# Patient Record
Sex: FEMALE | Race: WHITE
[De-identification: names, ages, dates, MRNs, and addresses within clinical notes are randomized per-mention and may not be internally consistent; named-entity substitution may affect disease eponyms.]

---

## 2018-02-15 ENCOUNTER — HOSPITAL ENCOUNTER (OUTPATIENT)
Dept: HOSPITAL 47 - RADCTMAIN | Age: 53
Discharge: HOME | End: 2018-02-15
Payer: COMMERCIAL

## 2018-02-15 ENCOUNTER — HOSPITAL ENCOUNTER (OUTPATIENT)
Dept: HOSPITAL 47 - LABWHC1 | Age: 53
Discharge: HOME | End: 2018-02-15
Payer: COMMERCIAL

## 2018-02-15 DIAGNOSIS — R10.9: Primary | ICD-10-CM

## 2018-02-15 LAB
ALBUMIN SERPL-MCNC: 4 G/DL (ref 3.5–5)
ALP SERPL-CCNC: 100 U/L (ref 38–126)
ALT SERPL-CCNC: 47 U/L (ref 9–52)
ANION GAP SERPL CALC-SCNC: 12 MMOL/L
AST SERPL-CCNC: 36 U/L (ref 14–36)
BASOPHILS # BLD AUTO: 0 K/UL (ref 0–0.2)
BASOPHILS NFR BLD AUTO: 0 %
BUN SERPL-SCNC: 11 MG/DL (ref 7–17)
CALCIUM SPEC-MCNC: 9.6 MG/DL (ref 8.4–10.2)
CHLORIDE SERPL-SCNC: 103 MMOL/L (ref 98–107)
CO2 SERPL-SCNC: 24 MMOL/L (ref 22–30)
EOSINOPHIL # BLD AUTO: 0.2 K/UL (ref 0–0.7)
EOSINOPHIL NFR BLD AUTO: 2 %
ERYTHROCYTE [DISTWIDTH] IN BLOOD BY AUTOMATED COUNT: 4.79 M/UL (ref 3.8–5.4)
ERYTHROCYTE [DISTWIDTH] IN BLOOD: 13.7 % (ref 11.5–15.5)
GLUCOSE SERPL-MCNC: 115 MG/DL (ref 74–99)
HCT VFR BLD AUTO: 45.5 % (ref 34–46)
HGB BLD-MCNC: 14.3 GM/DL (ref 11.4–16)
LYMPHOCYTES # SPEC AUTO: 2.5 K/UL (ref 1–4.8)
LYMPHOCYTES NFR SPEC AUTO: 21 %
MCH RBC QN AUTO: 29.9 PG (ref 25–35)
MCHC RBC AUTO-ENTMCNC: 31.4 G/DL (ref 31–37)
MCV RBC AUTO: 95 FL (ref 80–100)
MONOCYTES # BLD AUTO: 0.6 K/UL (ref 0–1)
MONOCYTES NFR BLD AUTO: 5 %
NEUTROPHILS # BLD AUTO: 8.1 K/UL (ref 1.3–7.7)
NEUTROPHILS NFR BLD AUTO: 70 %
PLATELET # BLD AUTO: 240 K/UL (ref 150–450)
POTASSIUM SERPL-SCNC: 3.8 MMOL/L (ref 3.5–5.1)
PROT SERPL-MCNC: 7.7 G/DL (ref 6.3–8.2)
SODIUM SERPL-SCNC: 139 MMOL/L (ref 137–145)
WBC # BLD AUTO: 11.5 K/UL (ref 3.8–10.6)

## 2018-02-15 PROCEDURE — 80053 COMPREHEN METABOLIC PANEL: CPT

## 2018-02-15 PROCEDURE — 87328 CRYPTOSPORIDIUM AG IA: CPT

## 2018-02-15 PROCEDURE — 87046 STOOL CULTR AEROBIC BACT EA: CPT

## 2018-02-15 PROCEDURE — 87045 FECES CULTURE AEROBIC BACT: CPT

## 2018-02-15 PROCEDURE — 87329 GIARDIA AG IA: CPT

## 2018-02-15 PROCEDURE — 36415 COLL VENOUS BLD VENIPUNCTURE: CPT

## 2018-02-15 PROCEDURE — 85025 COMPLETE CBC W/AUTO DIFF WBC: CPT

## 2018-02-15 PROCEDURE — 74177 CT ABD & PELVIS W/CONTRAST: CPT

## 2018-02-15 PROCEDURE — 87324 CLOSTRIDIUM AG IA: CPT

## 2018-02-15 NOTE — CT
EXAMINATION TYPE: CT abdomen pelvis w con

 

DATE OF EXAM: 2/15/2018

 

COMPARISON: NONE

 

HISTORY: Left lower quadrant pain

 

CT DLP: 3673.9 mGycm

Automated exposure control for dose reduction was used.

 

TECHNIQUE:  Helical acquisition of images from the lung bases through the pelvis have been completed.


 

CONTRAST: 

Performed with Oral Contrast and with IV Contrast, patient injected with 100 mL of Omnipaque 300.

 

FINDINGS: 

 

LUNG BASES: No significant abnormality is appreciated.

 

AORTA:  Atheromatous changes present without aneurysm, aortic atheromatous changes are present

 

LIVER/GB: Liver is enlarged and shows low attenuation likely due to hepatic steatosis. Gallbladder is
 normal.

 

PANCREAS: Small low dense focus within the region of the head of the pancreas may represent a small d
uodenal diverticulum at the 2nd-3rd portion.

 

SPLEEN: The spleen is enlarged.

 

ADRENALS: No significant abnormality is seen.

 

KIDNEYS: Some low dense foci associated with the kidneys may represent small cortical cysts

 

REPRODUCTIVE ORGANS: No significant abnormality is seen

 

BOWEL:  There is extensive sigmoid colonic wall thickening present, diverticular change present. Lindsey
sverse, and ascending and descending colon also shows abnormal wall thickening.

 

FREE AIR:  No Free Air visible.

 

ASCITES:  None visible.

 

PELVIC ADENOPATHY:  None visualized.

 

RETROPERITONEAL ADENOPATHY:  No Retroperitoneal Adenopathy visible.

 

URINARY BLADDER:  No significant abnormality is seen.

 

OSSEOUS STRUCTURES:  Suspect old fracture to the inferior pubic ramus on the right, there is cortical
 thickening. Degenerative disc and facet arthropathy changes are present in the lower lumbar spine.

 

IMPRESSION: 

CORRELATE FOR COLITIS. THERE MAY BE MUSCLE HYPERTROPHY IN THE SIGMOID COLON, DIFFICULT TO EXCLUDE A M
UCOSAL LESION, IF BOWEL SURVEILLANCE HAS NOT BEEN PERFORMED THEN IT SHOULD BE CONSIDERED TO EXCLUDE U
NDERLYING MASS.

## 2019-03-20 ENCOUNTER — HOSPITAL ENCOUNTER (EMERGENCY)
Dept: HOSPITAL 47 - EC | Age: 54
Discharge: HOME | End: 2019-03-20
Payer: COMMERCIAL

## 2019-03-20 VITALS — DIASTOLIC BLOOD PRESSURE: 99 MMHG | HEART RATE: 91 BPM | SYSTOLIC BLOOD PRESSURE: 149 MMHG

## 2019-03-20 VITALS — TEMPERATURE: 98.3 F | RESPIRATION RATE: 18 BRPM

## 2019-03-20 DIAGNOSIS — F17.200: ICD-10-CM

## 2019-03-20 DIAGNOSIS — E07.9: ICD-10-CM

## 2019-03-20 DIAGNOSIS — Z79.890: ICD-10-CM

## 2019-03-20 DIAGNOSIS — Z79.899: ICD-10-CM

## 2019-03-20 DIAGNOSIS — K57.32: Primary | ICD-10-CM

## 2019-03-20 DIAGNOSIS — Z98.51: ICD-10-CM

## 2019-03-20 LAB
ALBUMIN SERPL-MCNC: 3.9 G/DL (ref 3.5–5)
ALP SERPL-CCNC: 85 U/L (ref 38–126)
ALT SERPL-CCNC: 32 U/L (ref 9–52)
AMYLASE SERPL-CCNC: 36 U/L (ref 30–110)
ANION GAP SERPL CALC-SCNC: 8 MMOL/L
APTT BLD: 24.5 SEC (ref 22–30)
AST SERPL-CCNC: 24 U/L (ref 14–36)
BASOPHILS # BLD AUTO: 0 K/UL (ref 0–0.2)
BASOPHILS NFR BLD AUTO: 0 %
BUN SERPL-SCNC: 11 MG/DL (ref 7–17)
CALCIUM SPEC-MCNC: 9.3 MG/DL (ref 8.4–10.2)
CHLORIDE SERPL-SCNC: 107 MMOL/L (ref 98–107)
CO2 SERPL-SCNC: 24 MMOL/L (ref 22–30)
EOSINOPHIL # BLD AUTO: 0.1 K/UL (ref 0–0.7)
EOSINOPHIL NFR BLD AUTO: 1 %
ERYTHROCYTE [DISTWIDTH] IN BLOOD BY AUTOMATED COUNT: 4.87 M/UL (ref 3.8–5.4)
ERYTHROCYTE [DISTWIDTH] IN BLOOD: 13.4 % (ref 11.5–15.5)
GLUCOSE SERPL-MCNC: 114 MG/DL (ref 74–99)
HCT VFR BLD AUTO: 43.6 % (ref 34–46)
HGB BLD-MCNC: 14 GM/DL (ref 11.4–16)
INR PPP: 0.9 (ref ?–1.2)
LIPASE SERPL-CCNC: 39 U/L (ref 23–300)
LYMPHOCYTES # SPEC AUTO: 1.8 K/UL (ref 1–4.8)
LYMPHOCYTES NFR SPEC AUTO: 17 %
MCH RBC QN AUTO: 28.8 PG (ref 25–35)
MCHC RBC AUTO-ENTMCNC: 32.2 G/DL (ref 31–37)
MCV RBC AUTO: 89.5 FL (ref 80–100)
MONOCYTES # BLD AUTO: 0.6 K/UL (ref 0–1)
MONOCYTES NFR BLD AUTO: 6 %
NEUTROPHILS # BLD AUTO: 7.9 K/UL (ref 1.3–7.7)
NEUTROPHILS NFR BLD AUTO: 75 %
PH UR: 7 [PH] (ref 5–8)
PLATELET # BLD AUTO: 241 K/UL (ref 150–450)
POTASSIUM SERPL-SCNC: 4.1 MMOL/L (ref 3.5–5.1)
PROT SERPL-MCNC: 7.5 G/DL (ref 6.3–8.2)
PT BLD: 10.2 SEC (ref 9–12)
SODIUM SERPL-SCNC: 139 MMOL/L (ref 137–145)
SP GR UR: 1 (ref 1–1.03)
SQUAMOUS UR QL AUTO: 4 /HPF (ref 0–4)
UROBILINOGEN UR QL STRIP: <2 MG/DL (ref ?–2)
WBC # BLD AUTO: 10.5 K/UL (ref 3.8–10.6)
WBC #/AREA URNS HPF: 3 /HPF (ref 0–5)

## 2019-03-20 PROCEDURE — 85025 COMPLETE CBC W/AUTO DIFF WBC: CPT

## 2019-03-20 PROCEDURE — 36415 COLL VENOUS BLD VENIPUNCTURE: CPT

## 2019-03-20 PROCEDURE — 85730 THROMBOPLASTIN TIME PARTIAL: CPT

## 2019-03-20 PROCEDURE — 99284 EMERGENCY DEPT VISIT MOD MDM: CPT

## 2019-03-20 PROCEDURE — 74177 CT ABD & PELVIS W/CONTRAST: CPT

## 2019-03-20 PROCEDURE — 85610 PROTHROMBIN TIME: CPT

## 2019-03-20 PROCEDURE — 80053 COMPREHEN METABOLIC PANEL: CPT

## 2019-03-20 PROCEDURE — 96360 HYDRATION IV INFUSION INIT: CPT

## 2019-03-20 PROCEDURE — 81001 URINALYSIS AUTO W/SCOPE: CPT

## 2019-03-20 PROCEDURE — 83690 ASSAY OF LIPASE: CPT

## 2019-03-20 PROCEDURE — 82150 ASSAY OF AMYLASE: CPT

## 2019-03-20 PROCEDURE — 96372 THER/PROPH/DIAG INJ SC/IM: CPT

## 2019-03-20 NOTE — CT
EXAMINATION TYPE: CT abdomen pelvis w con

 

DATE OF EXAM: 3/20/2019

 

COMPARISON: 2/15/2018

 

HISTORY: Left lower quadrant pain x 2 days.

 

CT DLP: 2297.7 mGycm

 

CONTRAST: 

CT scan of the abdomen and pelvis is performed without Oral Contrast and with IV Contrast, patient in
jected with 100 mL of Isovue 300.

 

FINDINGS: 

LUNG BASES-: No visible nodule.  No infiltrate. 

 

LIVER/GB:   No calcified gallstones.  Suspect underlying hepatic steatosis versus diffuse hepatocellu
lar disease. No space occupying hepatic lesion. Biliary tree is of normal caliber. 

 

PANCREAS:  No inflammation.  No distinct mass. 

 

SPLEEN:  No splenic enlargement.  No lesion seen. 

 

ADRENALS:  No nodule.  No thickening. 

 

KIDNEYS/BLADDER:  No hydronephrosis.  No nephrolithiasis. Hypoattenuating renal lesions may reflect c
ysts. Urinary bladder grossly unremarkable. 

 

BOWEL: Normal appendix. There is wall thickening in the surrounding inflammatory change involving the
 distal sigmoid colon compatible with acute diverticulitis. No evidence for abscess or perforation at
 this time. Remaining colon and small bowel appear unremarkable.

 

GENITAL ORGANS:  No gross abnormality. 

 

LYMPH NODES:  No greater than 1cm abdominal or pelvic lymph nodes are appreciated.

 

AORTA: No significant abnormality. 

 

OSSEOUS STRUCTURES: Severe degenerative change lumbar spine.

 

OTHER:  No significant additional abnormality is seen. 

 

IMPRESSION: 

1. Uncomplicated acute sigmoid diverticulitis.

## 2019-03-20 NOTE — ED
General Adult HPI





- General


Chief complaint: Abdominal Pain


Stated complaint: L Side Pain


Time Seen by Provider: 03/20/19 10:56


Source: patient, RN notes reviewed


Mode of arrival: ambulatory


Limitations: no limitations





- History of Present Illness


Initial comments: 





Patient is a pleasant 53-year-old female presenting to the emergency Department 

with complaints of abdominal discomfort.  Onset of symptoms was a week or so ago

and lasted a day or 2 then resolved.  Symptoms returned yesterday and seem 

somewhat worse.  Discomfort seems worsened since that time.  Patient did go to 

the clinic today and was advised to come to the emergency department.  Dis

comfort is worse with position changes and bumps in the car ride.  Discomfort is

left lower abdomen.  No fever.  No nausea vomiting.  patient diarrhea.  No 

urinary symptoms.  No history of similar symptoms prior to 1 week ago.





- Related Data


                                Home Medications











 Medication  Instructions  Recorded  Confirmed


 


Cholecalciferol (Vitamin D3) 2,000 unit PO DAILY 03/20/19 03/20/19





[Vitamin D3]   


 


Levothyroxine Sodium [Synthroid] 100 mcg PO DAILY 03/20/19 03/20/19








                                  Previous Rx's











 Medication  Instructions  Recorded


 


Levofloxacin [Levaquin] 500 mg PO DAILY #10 tab 03/20/19


 


metroNIDAZOLE [Flagyl] 500 mg PO TID #30 tab 03/20/19











                                    Allergies











Allergy/AdvReac Type Severity Reaction Status Date / Time


 


No Known Allergies Allergy   Verified 03/20/19 10:54














Review of Systems


ROS Statement: 


Those systems with pertinent positive or pertinent negative responses have been 

documented in the HPI.





ROS Other: All systems not noted in ROS Statement are negative.


Constitutional: Denies: fever, chills


Eyes: Denies: eye pain


ENT: Denies: ear pain


Respiratory: Denies: cough, dyspnea


Cardiovascular: Denies: chest pain


Endocrine: Denies: fatigue


Gastrointestinal: Reports: abdominal pain.  Denies: nausea, vomiting, diarrhea, 

constipation


Genitourinary: Denies: urgency, dysuria, frequency, hematuria


Musculoskeletal: Denies: back pain


Skin: Denies: rash


Neurological: Denies: weakness





Past Medical History


Past Medical History: Thyroid Disorder


History of Any Multi-Drug Resistant Organisms: None Reported


Past Surgical History: Tubal Ligation


Past Psychological History: No Psychological Hx Reported


Smoking Status: Current every day smoker


Past Alcohol Use History: Occasional


Past Drug Use History: None Reported





General Exam


Limitations: no limitations


General appearance: alert, in no apparent distress


Head exam: Present: atraumatic


Eye exam: Present: normal appearance, PERRL


ENT exam: Present: normal oropharynx


Neck exam: Present: normal inspection


Respiratory exam: Present: normal lung sounds bilaterally


Cardiovascular Exam: Present: regular rate, normal rhythm


  ** Expanded


Peripheral pulses: 2+: Posterior Tibialis (R), Posterior Tibialis (L)


GI/Abdominal exam: Present: soft, tenderness (Moderate tenderness left lower 

quadrant), normal bowel sounds.  Absent: distended, guarding, rebound, rigid, 

pulsatile mass


Extremities exam: Present: normal inspection.  Absent: pedal edema, calf 

tenderness


Neurological exam: Present: alert


Psychiatric exam: Present: normal affect, normal mood


Skin exam: Present: normal color





Course


                                   Vital Signs











  03/20/19





  10:51


 


Temperature 98.3 F


 


Pulse Rate 105 H


 


Respiratory 18





Rate 


 


Blood Pressure 133/87


 


O2 Sat by Pulse 96





Oximetry 














Medical Decision Making





- Medical Decision Making





Patient reevaluated and improved.  Patient updated on results.  Patient is com

fortable with discharge home.





- Lab Data


Result diagrams: 


                                 03/20/19 11:34





                                 03/20/19 11:34


                                   Lab Results











  03/20/19 03/20/19 03/20/19 Range/Units





  11:00 11:34 11:34 


 


WBC    10.5  (3.8-10.6)  k/uL


 


RBC    4.87  (3.80-5.40)  m/uL


 


Hgb    14.0  (11.4-16.0)  gm/dL


 


Hct    43.6  (34.0-46.0)  %


 


MCV    89.5  (80.0-100.0)  fL


 


MCH    28.8  (25.0-35.0)  pg


 


MCHC    32.2  (31.0-37.0)  g/dL


 


RDW    13.4  (11.5-15.5)  %


 


Plt Count    241  (150-450)  k/uL


 


Neutrophils %    75  %


 


Lymphocytes %    17  %


 


Monocytes %    6  %


 


Eosinophils %    1  %


 


Basophils %    0  %


 


Neutrophils #    7.9 H  (1.3-7.7)  k/uL


 


Lymphocytes #    1.8  (1.0-4.8)  k/uL


 


Monocytes #    0.6  (0-1.0)  k/uL


 


Eosinophils #    0.1  (0-0.7)  k/uL


 


Basophils #    0.0  (0-0.2)  k/uL


 


PT     (9.0-12.0)  sec


 


INR     (<1.2)  


 


APTT     (22.0-30.0)  sec


 


Sodium   139   (137-145)  mmol/L


 


Potassium   4.1   (3.5-5.1)  mmol/L


 


Chloride   107   ()  mmol/L


 


Carbon Dioxide   24   (22-30)  mmol/L


 


Anion Gap   8   mmol/L


 


BUN   11   (7-17)  mg/dL


 


Creatinine   0.48 L   (0.52-1.04)  mg/dL


 


Est GFR (CKD-EPI)AfAm   >90   (>60 ml/min/1.73 sqM)  


 


Est GFR (CKD-EPI)NonAf   >90   (>60 ml/min/1.73 sqM)  


 


Glucose   114 H   (74-99)  mg/dL


 


Calcium   9.3   (8.4-10.2)  mg/dL


 


Total Bilirubin   0.9   (0.2-1.3)  mg/dL


 


AST   24   (14-36)  U/L


 


ALT   32   (9-52)  U/L


 


Alkaline Phosphatase   85   ()  U/L


 


Total Protein   7.5   (6.3-8.2)  g/dL


 


Albumin   3.9   (3.5-5.0)  g/dL


 


Amylase   36   ()  U/L


 


Lipase   39   ()  U/L


 


Urine Color  Light Yellow    


 


Urine Appearance  Cloudy H    (Clear)  


 


Urine pH  7.0    (5.0-8.0)  


 


Ur Specific Gravity  1.004    (1.001-1.035)  


 


Urine Protein  Negative    (Negative)  


 


Urine Glucose (UA)  Negative    (Negative)  


 


Urine Ketones  Negative    (Negative)  


 


Urine Blood  Negative    (Negative)  


 


Urine Nitrite  Negative    (Negative)  


 


Urine Bilirubin  Negative    (Negative)  


 


Urine Urobilinogen  <2.0    (<2.0)  mg/dL


 


Ur Leukocyte Esterase  Negative    (Negative)  


 


Urine WBC  3    (0-5)  /hpf


 


Ur Squamous Epith Cells  4    (0-4)  /hpf


 


Urine Bacteria  Moderate H    (None)  /hpf














  03/20/19 Range/Units





  11:34 


 


WBC   (3.8-10.6)  k/uL


 


RBC   (3.80-5.40)  m/uL


 


Hgb   (11.4-16.0)  gm/dL


 


Hct   (34.0-46.0)  %


 


MCV   (80.0-100.0)  fL


 


MCH   (25.0-35.0)  pg


 


MCHC   (31.0-37.0)  g/dL


 


RDW   (11.5-15.5)  %


 


Plt Count   (150-450)  k/uL


 


Neutrophils %   %


 


Lymphocytes %   %


 


Monocytes %   %


 


Eosinophils %   %


 


Basophils %   %


 


Neutrophils #   (1.3-7.7)  k/uL


 


Lymphocytes #   (1.0-4.8)  k/uL


 


Monocytes #   (0-1.0)  k/uL


 


Eosinophils #   (0-0.7)  k/uL


 


Basophils #   (0-0.2)  k/uL


 


PT  10.2  (9.0-12.0)  sec


 


INR  0.9  (<1.2)  


 


APTT  24.5  (22.0-30.0)  sec


 


Sodium   (137-145)  mmol/L


 


Potassium   (3.5-5.1)  mmol/L


 


Chloride   ()  mmol/L


 


Carbon Dioxide   (22-30)  mmol/L


 


Anion Gap   mmol/L


 


BUN   (7-17)  mg/dL


 


Creatinine   (0.52-1.04)  mg/dL


 


Est GFR (CKD-EPI)AfAm   (>60 ml/min/1.73 sqM)  


 


Est GFR (CKD-EPI)NonAf   (>60 ml/min/1.73 sqM)  


 


Glucose   (74-99)  mg/dL


 


Calcium   (8.4-10.2)  mg/dL


 


Total Bilirubin   (0.2-1.3)  mg/dL


 


AST   (14-36)  U/L


 


ALT   (9-52)  U/L


 


Alkaline Phosphatase   ()  U/L


 


Total Protein   (6.3-8.2)  g/dL


 


Albumin   (3.5-5.0)  g/dL


 


Amylase   ()  U/L


 


Lipase   ()  U/L


 


Urine Color   


 


Urine Appearance   (Clear)  


 


Urine pH   (5.0-8.0)  


 


Ur Specific Gravity   (1.001-1.035)  


 


Urine Protein   (Negative)  


 


Urine Glucose (UA)   (Negative)  


 


Urine Ketones   (Negative)  


 


Urine Blood   (Negative)  


 


Urine Nitrite   (Negative)  


 


Urine Bilirubin   (Negative)  


 


Urine Urobilinogen   (<2.0)  mg/dL


 


Ur Leukocyte Esterase   (Negative)  


 


Urine WBC   (0-5)  /hpf


 


Ur Squamous Epith Cells   (0-4)  /hpf


 


Urine Bacteria   (None)  /hpf














- Radiology Data


Radiology results: report reviewed (Computed tomography scan of the abdomen and 

pelvis shows uncompensated diverticulitis.)





Disposition


Clinical Impression: 


 Diverticulitis





Disposition: HOME SELF-CARE


Condition: Stable


Instructions (If sedation given, give patient instructions):  Diverticulitis 

(ED), Diverticulitis Diet (ED)


Additional Instructions: 


Please follow-up with primary care physician in the next couple days for 

recheck.  Return for fever, increased pain, worsening symptoms or other 

concerns.


Prescriptions: 


metroNIDAZOLE [Flagyl] 500 mg PO TID #30 tab


Levofloxacin [Levaquin] 500 mg PO DAILY #10 tab


Is patient prescribed a controlled substance at d/c from ED?: No


Referrals: 


Arturo Villalobos MD [Primary Care Provider] - 1-2 days


Time of Disposition: 13:03

## 2021-06-16 ENCOUNTER — HOSPITAL ENCOUNTER (OUTPATIENT)
Dept: HOSPITAL 47 - RADMAMWWP | Age: 56
Discharge: HOME | End: 2021-06-16
Attending: FAMILY MEDICINE
Payer: COMMERCIAL

## 2021-06-16 DIAGNOSIS — Z12.31: Primary | ICD-10-CM

## 2021-06-16 DIAGNOSIS — Z78.0: ICD-10-CM

## 2021-06-16 PROCEDURE — 77067 SCR MAMMO BI INCL CAD: CPT

## 2021-06-18 NOTE — MM
Reason for exam: screening  (asymptomatic).

Last mammogram was performed 7 years and 9 months ago.



History:

Patient is postmenopausal.

Took hormonal contraceptives for 20 years.



Physical Findings:

A clinical breast exam by your physician is recommended on an annual basis and 

results should be correlated with mammographic findings.



MG Screening Mammo w CAD

Bilateral CC and MLO view(s) were taken.

Prior study comparison: September 18, 2013, right diagnostic mammogram w/CAD.  

January 2, 2013, bilateral digital screening mammo w/CAD.

There are scattered fibroglandular densities.





ASSESSMENT: Negative, BI-RAD 1



RECOMMENDATION:

Routine screening mammogram of both breasts in 1 year.